# Patient Record
Sex: FEMALE | Race: WHITE
[De-identification: names, ages, dates, MRNs, and addresses within clinical notes are randomized per-mention and may not be internally consistent; named-entity substitution may affect disease eponyms.]

---

## 2017-10-16 ENCOUNTER — HOSPITAL ENCOUNTER (INPATIENT)
Dept: HOSPITAL 10 - OBG | Age: 42
LOS: 4 days | Discharge: HOME | DRG: 781 | End: 2017-10-20
Attending: OBSTETRICS & GYNECOLOGY | Admitting: OBSTETRICS & GYNECOLOGY
Payer: MEDICAID

## 2017-10-16 VITALS — DIASTOLIC BLOOD PRESSURE: 48 MMHG | RESPIRATION RATE: 20 BRPM | SYSTOLIC BLOOD PRESSURE: 98 MMHG | HEART RATE: 80 BPM

## 2017-10-16 VITALS
WEIGHT: 159.17 LBS | BODY MASS INDEX: 31.25 KG/M2 | BODY MASS INDEX: 31.25 KG/M2 | HEIGHT: 60 IN | WEIGHT: 159.17 LBS | HEIGHT: 60 IN

## 2017-10-16 DIAGNOSIS — O09.523: ICD-10-CM

## 2017-10-16 DIAGNOSIS — Z3A.09: ICD-10-CM

## 2017-10-16 DIAGNOSIS — O24.419: Primary | ICD-10-CM

## 2017-10-16 LAB
ADD UMIC: NO
ALBUMIN SERPL-MCNC: 3.8 G/DL (ref 3.3–4.9)
ALBUMIN/GLOB SERPL: 1.08 {RATIO}
ALP SERPL-CCNC: 66 IU/L (ref 42–121)
ALT SERPL-CCNC: 49 IU/L (ref 13–69)
ANION GAP SERPL CALC-SCNC: 17 MMOL/L (ref 8–16)
AST SERPL-CCNC: 29 IU/L (ref 15–46)
BACTERIA #/AREA URNS HPF: (no result) /HPF
BASOPHILS # BLD AUTO: 0 10^3/UL (ref 0–0.1)
BASOPHILS NFR BLD: 0.5 % (ref 0–2)
BILIRUB DIRECT SERPL-MCNC: 0 MG/DL (ref 0–0.2)
BILIRUB SERPL-MCNC: 0.5 MG/DL (ref 0.2–1.3)
BUN SERPL-MCNC: 5 MG/DL (ref 7–20)
CALCIUM SERPL-MCNC: 9.6 MG/DL (ref 8.4–10.2)
CHLORIDE SERPL-SCNC: 104 MMOL/L (ref 97–110)
CO2 SERPL-SCNC: 21 MMOL/L (ref 21–31)
COLOR UR: YELLOW
CREAT SERPL-MCNC: 0.46 MG/DL (ref 0.44–1)
EOSINOPHIL # BLD: 0.1 10^3/UL (ref 0–0.5)
EOSINOPHIL NFR BLD: 1.3 % (ref 0–7)
ERYTHROCYTE [DISTWIDTH] IN BLOOD BY AUTOMATED COUNT: 12.4 % (ref 11.5–14.5)
GLOBULIN SER-MCNC: 3.5 G/DL (ref 1.3–3.2)
GLUCOSE SERPL-MCNC: 148 MG/DL (ref 70–220)
GLUCOSE UR STRIP-MCNC: NEGATIVE MG/DL
HCT VFR BLD CALC: 35.2 % (ref 37–47)
HGB BLD-MCNC: 11.7 G/DL (ref 12–16)
KETONES UR STRIP.AUTO-MCNC: (no result) MG/DL
LYMPHOCYTES # BLD AUTO: 2 10^3/UL (ref 0.8–2.9)
LYMPHOCYTES NFR BLD AUTO: 23.2 % (ref 15–51)
MCH RBC QN AUTO: 28.3 PG (ref 29–33)
MCHC RBC AUTO-ENTMCNC: 33.2 G/DL (ref 32–37)
MCV RBC AUTO: 85 FL (ref 82–101)
MONOCYTES # BLD: 0.5 10^3/UL (ref 0.3–0.9)
MONOCYTES NFR BLD: 5.8 % (ref 0–11)
MUCOUS THREADS #/AREA URNS HPF: (no result) /HPF
NEUTROPHILS # BLD: 5.8 10^3/UL (ref 1.6–7.5)
NEUTROPHILS NFR BLD AUTO: 68.6 % (ref 39–77)
NITRITE UR QL STRIP.AUTO: NEGATIVE MG/DL
NRBC # BLD MANUAL: 0 10^3/UL (ref 0–0)
NRBC BLD AUTO-RTO: 0 /100WBC (ref 0–0)
PLATELET # BLD: 261 10^3/UL (ref 140–415)
PMV BLD AUTO: 9.7 FL (ref 7.4–10.4)
POTASSIUM SERPL-SCNC: 3.6 MMOL/L (ref 3.5–5.1)
PROT SERPL-MCNC: 7.3 G/DL (ref 6.1–8.1)
RBC # BLD AUTO: 4.14 10^6/UL (ref 4.2–5.4)
RBC # UR AUTO: NEGATIVE MG/DL
SODIUM SERPL-SCNC: 138 MMOL/L (ref 135–144)
SQUAMOUS #/AREA URNS HPF: (no result) /HPF
UR ASCORBIC ACID: 40 MG/DL
UR BILIRUBIN (DIP): NEGATIVE MG/DL
UR CLARITY: (no result)
UR PH (DIP): 6 (ref 5–9)
UR RBC: 1 /HPF (ref 0–5)
UR SPECIFIC GRAVITY (DIP): 1.02 (ref 1–1.03)
UR TOTAL PROTEIN (DIP): NEGATIVE MG/DL
UROBILINOGEN UR STRIP-ACNC: NEGATIVE MG/DL
WBC # BLD AUTO: 8.4 10^3/UL (ref 4.8–10.8)
WBC # UR STRIP: NEGATIVE LEU/UL

## 2017-10-16 PROCEDURE — 85025 COMPLETE CBC W/AUTO DIFF WBC: CPT

## 2017-10-16 PROCEDURE — 81003 URINALYSIS AUTO W/O SCOPE: CPT

## 2017-10-16 PROCEDURE — 80053 COMPREHEN METABOLIC PANEL: CPT

## 2017-10-16 PROCEDURE — 84156 ASSAY OF PROTEIN URINE: CPT

## 2017-10-16 PROCEDURE — 87086 URINE CULTURE/COLONY COUNT: CPT

## 2017-10-16 PROCEDURE — 81001 URINALYSIS AUTO W/SCOPE: CPT

## 2017-10-16 PROCEDURE — 82962 GLUCOSE BLOOD TEST: CPT

## 2017-10-16 PROCEDURE — 90686 IIV4 VACC NO PRSV 0.5 ML IM: CPT

## 2017-10-16 PROCEDURE — 82575 CREATININE CLEARANCE TEST: CPT

## 2017-10-16 PROCEDURE — 83036 HEMOGLOBIN GLYCOSYLATED A1C: CPT

## 2017-10-17 LAB
COLLECTION PERIOD: 24 HRS
COLLECTION PERIOD: 24 HRS
PROT UR STRIP-MCNC: 8 MG/DL
SCRET: 0.46 MG/DL (ref 0.44–1)

## 2017-10-17 RX ADMIN — Medication SCH TAB: at 18:19

## 2017-10-17 NOTE — QN
Documentation


Comment


Afebrile


Signs are stable


Fasting and 2 hours postprandial within normal, diabetic educator providing her 

with glucometer for during her blood glucose at home possibility of a.m. 

discharge discussed with the patient











JANIE CALIX MD Oct 17, 2017 16:32

## 2017-10-18 RX ADMIN — Medication SCH TAB: at 09:05

## 2017-10-18 NOTE — QN
Documentation


Comment


Vital signs are stable, fasting blood sugar today 107' ,2 hours postprandial 

post breakfast yesterday blood sugar 125' did not require sliding scale, plan 

of a.m. discharge with follow-up at diabetes clinic as outpatient











JANIE CALIX MD Oct 18, 2017 10:06

## 2017-10-19 RX ADMIN — Medication SCH TAB: at 09:52

## 2017-10-19 NOTE — QN
Documentation


Comment


Vital signs stable


Infrequent mild contraction mostly not felt


Cervical length2.7


Presently on Procardia 20 mg every 6 hours


We will continue present treatment.











JANIE CALIX MD Oct 19, 2017 14:04

## 2017-10-19 NOTE — QN
Documentation


Comment


Vital signs are stable


Fasting blood sugar 94


Needs education to be able monitor blood glucose at home


Requires glucometer, other supplies for home glucose monitoring.











JANIE CALIX MD Oct 19, 2017 14:03

## 2017-10-20 RX ADMIN — Medication SCH TAB: at 08:53

## 2017-10-20 NOTE — QN
Documentation


Comment


Quick Note:


10 wks with uncontrolled DM


Doing fine No complaints


Instruction are given to patient by Perinatalogist and diabetic control


Patient's questions answered


--->discharge Home











FARHAT DIANE M.D. Oct 20, 2017 15:30

## 2017-10-20 NOTE — DS
Date/Time of Note


Date/Time of Note


DATE: 10/20/17 


TIME: 15:31





Discharge Summary


Admission/Discharge Info


Admit Date/Time


Oct 16, 2017 at 13:24


Discharge Date/Time


10/20/17


Discharge Diagnosis


Uncontrollected DM


Patient Condition:  Good


Hospital Course


uneventful


Primary Care Provider


Care Physician No Primary


Pending Labs





Laboratory Tests








Test


  10/19/17


19:35 10/20/17


08:06 10/20/17


10:55 10/20/17


12:20


 


Bedside Glucose


  95mg/dL


() 102mg/dL


() 103mg/dL


() 80mg/dL


()

















FARHAT DIANE M.D. Oct 20, 2017 15:31

## 2018-04-24 ENCOUNTER — HOSPITAL ENCOUNTER (INPATIENT)
Dept: HOSPITAL 91 - OBT | Age: 43
LOS: 3 days | Discharge: HOME | End: 2018-04-27
Payer: MEDICAID

## 2018-04-24 ENCOUNTER — HOSPITAL ENCOUNTER (INPATIENT)
Age: 43
LOS: 3 days | Discharge: HOME | End: 2018-04-27

## 2018-04-24 DIAGNOSIS — O34.211: Primary | ICD-10-CM

## 2018-04-24 DIAGNOSIS — Z3A.37: ICD-10-CM

## 2018-04-24 DIAGNOSIS — Z23: ICD-10-CM

## 2018-04-24 LAB
ADD MAN DIFF?: NO
BASOPHIL #: 0 10^3/UL (ref 0–0.1)
BASOPHILS %: 0.3 % (ref 0–2)
EOSINOPHILS #: 0.2 10^3/UL (ref 0–0.5)
EOSINOPHILS %: 1.7 % (ref 0–7)
HEMATOCRIT: 35.4 % (ref 37–47)
HEMOGLOBIN: 12 G/DL (ref 12–16)
INR: 0.87
LYMPHOCYTES #: 2.3 10^3/UL (ref 0.8–2.9)
LYMPHOCYTES %: 24.3 % (ref 15–51)
MEAN CORPUSCULAR HEMOGLOBIN: 28.8 PG (ref 29–33)
MEAN CORPUSCULAR HGB CONC: 33.9 G/DL (ref 32–37)
MEAN CORPUSCULAR VOLUME: 85.1 FL (ref 82–101)
MEAN PLATELET VOLUME: 10.4 FL (ref 7.4–10.4)
MONOCYTE #: 0.8 10^3/UL (ref 0.3–0.9)
MONOCYTES %: 8.4 % (ref 0–11)
NEUTROPHIL #: 6 10^3/UL (ref 1.6–7.5)
NEUTROPHILS %: 63.8 % (ref 39–77)
NUCLEATED RED BLOOD CELLS #: 0 10^3/UL (ref 0–0)
NUCLEATED RED BLOOD CELLS%: 0 /100WBC (ref 0–0)
PARTIAL THROMBOPLASTIN TIME: 26.2 SEC (ref 25–35)
PLATELET COUNT: 200 10^3/UL (ref 140–415)
PROTIME: 11.9 SEC (ref 11.9–14.9)
PT RATIO: 0.9
RED BLOOD COUNT: 4.16 10^6/UL (ref 4.2–5.4)
RED CELL DISTRIBUTION WIDTH: 13.8 % (ref 11.5–14.5)
WHITE BLOOD COUNT: 9.4 10^3/UL (ref 4.8–10.8)

## 2018-04-24 PROCEDURE — 85730 THROMBOPLASTIN TIME PARTIAL: CPT

## 2018-04-24 PROCEDURE — 82962 GLUCOSE BLOOD TEST: CPT

## 2018-04-24 PROCEDURE — 86850 RBC ANTIBODY SCREEN: CPT

## 2018-04-24 PROCEDURE — 4A1HXCZ MONITORING OF PRODUCTS OF CONCEPTION, CARDIAC RATE, EXTERNAL APPROACH: ICD-10-PCS

## 2018-04-24 PROCEDURE — 86901 BLOOD TYPING SEROLOGIC RH(D): CPT

## 2018-04-24 PROCEDURE — 85610 PROTHROMBIN TIME: CPT

## 2018-04-24 PROCEDURE — 86592 SYPHILIS TEST NON-TREP QUAL: CPT

## 2018-04-24 PROCEDURE — 85025 COMPLETE CBC W/AUTO DIFF WBC: CPT

## 2018-04-24 PROCEDURE — 90715 TDAP VACCINE 7 YRS/> IM: CPT

## 2018-04-24 PROCEDURE — 86900 BLOOD TYPING SEROLOGIC ABO: CPT

## 2018-04-24 PROCEDURE — 94760 N-INVAS EAR/PLS OXIMETRY 1: CPT

## 2018-04-24 RX ADMIN — PYRIDOXINE HYDROCHLORIDE 1 MLS/HR: 100 INJECTION, SOLUTION INTRAMUSCULAR; INTRAVENOUS at 17:18

## 2018-04-24 RX ADMIN — DIPHENHYDRAMINE HYDROCHLORIDE 1 MG: 50 INJECTION, SOLUTION INTRAMUSCULAR; INTRAVENOUS at 20:12

## 2018-04-24 RX ADMIN — ONDANSETRON HYDROCHLORIDE 1 MG: 2 INJECTION, SOLUTION INTRAMUSCULAR; INTRAVENOUS at 19:50

## 2018-04-24 RX ADMIN — Medication 1 MLS/HR: at 20:03

## 2018-04-24 RX ADMIN — ONDANSETRON HYDROCHLORIDE 1 MG: 2 INJECTION, SOLUTION INTRAMUSCULAR; INTRAVENOUS at 17:42

## 2018-04-24 RX ADMIN — SODIUM CITRATE AND CITRIC ACID MONOHYDRATE 1 ML: 500; 334 SOLUTION ORAL at 17:42

## 2018-04-24 RX ADMIN — PYRIDOXINE HYDROCHLORIDE 1 MLS/HR: 100 INJECTION, SOLUTION INTRAMUSCULAR; INTRAVENOUS at 17:42

## 2018-04-25 LAB
ADD MAN DIFF?: NO
BASOPHIL #: 0 10^3/UL (ref 0–0.1)
BASOPHILS %: 0.2 % (ref 0–2)
EOSINOPHILS #: 0 10^3/UL (ref 0–0.5)
EOSINOPHILS %: 0.3 % (ref 0–7)
HEMATOCRIT: 28.9 % (ref 37–47)
HEMOGLOBIN: 9.6 G/DL (ref 12–16)
LYMPHOCYTES #: 1.7 10^3/UL (ref 0.8–2.9)
LYMPHOCYTES %: 18.1 % (ref 15–51)
MEAN CORPUSCULAR HEMOGLOBIN: 28.7 PG (ref 29–33)
MEAN CORPUSCULAR HGB CONC: 33.2 G/DL (ref 32–37)
MEAN CORPUSCULAR VOLUME: 86.3 FL (ref 82–101)
MEAN PLATELET VOLUME: 11.3 FL (ref 7.4–10.4)
MONOCYTE #: 0.7 10^3/UL (ref 0.3–0.9)
MONOCYTES %: 7.1 % (ref 0–11)
NEUTROPHIL #: 6.9 10^3/UL (ref 1.6–7.5)
NEUTROPHILS %: 73.5 % (ref 39–77)
NUCLEATED RED BLOOD CELLS #: 0 10^3/UL (ref 0–0)
NUCLEATED RED BLOOD CELLS%: 0 /100WBC (ref 0–0)
PLATELET COUNT: 176 10^3/UL (ref 140–415)
RAPID PLASMA REAGIN: NONREACTIVE
RED BLOOD COUNT: 3.35 10^6/UL (ref 4.2–5.4)
RED CELL DISTRIBUTION WIDTH: 13.9 % (ref 11.5–14.5)
WHITE BLOOD COUNT: 9.5 10^3/UL (ref 4.8–10.8)

## 2018-04-25 RX ADMIN — Medication 1 MLS/HR: at 14:26

## 2018-04-25 RX ADMIN — PYRIDOXINE HYDROCHLORIDE 1 MLS/HR: 100 INJECTION, SOLUTION INTRAMUSCULAR; INTRAVENOUS at 14:36

## 2018-04-25 RX ADMIN — Medication 1 MLS/HR: at 06:26

## 2018-04-25 RX ADMIN — Medication 1 MLS/HR: at 09:34

## 2018-04-25 RX ADMIN — DOCUSATE SODIUM AND SENNOSIDES 1 TAB: 8.6; 5 TABLET, FILM COATED ORAL at 21:07

## 2018-04-25 RX ADMIN — HYDROCODONE BITARTRATE AND ACETAMINOPHEN 1 TAB: 5; 325 TABLET ORAL at 22:35

## 2018-04-25 RX ADMIN — CEFAZOLIN 1 MLS/HR: 1 INJECTION, POWDER, FOR SOLUTION INTRAMUSCULAR; INTRAVENOUS at 02:04

## 2018-04-25 RX ADMIN — Medication 1 MLS/HR: at 10:26

## 2018-04-25 RX ADMIN — KETOROLAC TROMETHAMINE 1 MG: 30 INJECTION, SOLUTION INTRAMUSCULAR at 14:55

## 2018-04-25 RX ADMIN — DOCUSATE SODIUM AND SENNOSIDES 1 TAB: 8.6; 5 TABLET, FILM COATED ORAL at 08:33

## 2018-04-25 RX ADMIN — IBUPROFEN 1 MG: 600 TABLET ORAL at 18:00

## 2018-04-25 RX ADMIN — PYRIDOXINE HYDROCHLORIDE 1 MLS/HR: 100 INJECTION, SOLUTION INTRAMUSCULAR; INTRAVENOUS at 01:08

## 2018-04-25 RX ADMIN — Medication 1 MLS/HR: at 02:26

## 2018-04-26 RX ADMIN — HYDROCODONE BITARTRATE AND ACETAMINOPHEN 1 TAB: 5; 325 TABLET ORAL at 16:13

## 2018-04-26 RX ADMIN — DOCUSATE SODIUM AND SENNOSIDES 1 TAB: 8.6; 5 TABLET, FILM COATED ORAL at 08:49

## 2018-04-26 RX ADMIN — IBUPROFEN 1 MG: 600 TABLET ORAL at 00:02

## 2018-04-26 RX ADMIN — IBUPROFEN 1 MG: 600 TABLET ORAL at 17:42

## 2018-04-26 RX ADMIN — IBUPROFEN 1 MG: 600 TABLET ORAL at 05:55

## 2018-04-26 RX ADMIN — DOCUSATE SODIUM AND SENNOSIDES 1 TAB: 8.6; 5 TABLET, FILM COATED ORAL at 21:35

## 2018-04-26 RX ADMIN — IBUPROFEN 1 MG: 600 TABLET ORAL at 12:14

## 2018-04-26 RX ADMIN — SODIUM PHOSPHATE, DIBASIC AND SODIUM PHOSPHATE, MONOBASIC 1 ML: 7; 19 ENEMA RECTAL at 13:30

## 2018-04-27 RX ADMIN — IBUPROFEN 1 MG: 600 TABLET ORAL at 17:53

## 2018-04-27 RX ADMIN — CLOSTRIDIUM TETANI TOXOID ANTIGEN (FORMALDEHYDE INACTIVATED), CORYNEBACTERIUM DIPHTHERIAE TOXOID ANTIGEN (FORMALDEHYDE INACTIVATED), BORDETELLA PERTUSSIS TOXOID ANTIGEN (GLUTARALDEHYDE INACTIVATED), BORDETELLA PERTUSSIS FILAMENTOUS HEMAGGLUTININ ANTIGEN (FORMALDEHYDE INACTIVATED), BORDETELLA PERTUSSIS PERTACTIN ANTIGEN, AND BORDETELLA PERTUSSIS FIMBRIAE 2/3 ANTIGEN 1 ML: 5; 2; 2.5; 5; 3; 5 INJECTION, SUSPENSION INTRAMUSCULAR at 15:58

## 2018-04-27 RX ADMIN — Medication 1 APPLIC: at 15:58

## 2018-04-27 RX ADMIN — OXYCODONE HYDROCHLORIDE AND ACETAMINOPHEN 1 TAB: 5; 325 TABLET ORAL at 15:59

## 2018-04-27 RX ADMIN — IBUPROFEN 1 MG: 600 TABLET ORAL at 11:40

## 2018-04-27 RX ADMIN — DOCUSATE SODIUM AND SENNOSIDES 1 TAB: 8.6; 5 TABLET, FILM COATED ORAL at 09:12

## 2018-04-27 RX ADMIN — IBUPROFEN 1 MG: 600 TABLET ORAL at 00:10

## 2018-04-27 RX ADMIN — IBUPROFEN 1 MG: 600 TABLET ORAL at 05:46
